# Patient Record
Sex: FEMALE | Employment: OTHER | URBAN - METROPOLITAN AREA
[De-identification: names, ages, dates, MRNs, and addresses within clinical notes are randomized per-mention and may not be internally consistent; named-entity substitution may affect disease eponyms.]

---

## 2017-12-20 ENCOUNTER — HOSPITAL ENCOUNTER (OUTPATIENT)
Dept: PHYSICAL THERAPY | Age: 62
Discharge: HOME | End: 2017-12-20
Payer: OTHER GOVERNMENT

## 2017-12-20 PROCEDURE — 97161 PT EVAL LOW COMPLEX 20 MIN: CPT

## 2017-12-20 PROCEDURE — 97110 THERAPEUTIC EXERCISES: CPT

## 2017-12-20 PROCEDURE — 97140 MANUAL THERAPY 1/> REGIONS: CPT

## 2017-12-20 NOTE — PROGRESS NOTES
PT DAILY TREATMENT NOTE/HIP EVAL3-16    Patient Name: Penny Pay  Date:2017  : 1955  [x]  Patient  Verified  Payor:  / Plan: [facility]  RETIREES AND DEPENDENTS / Product Type: Harvinder Kevin /    In LRFE:[id number]  Out time:1020  Total Treatment Time (min): 62   Visit #: 1 of 10    Treatment Area: Pain in left hip [M25.552]    SUBJECTIVE  Pain Level (0-10 scale): 1-2  []constant [x]intermittent []improving []worsening []no change since onset    Any medication changes, allergies to medications, adverse drug reactions, diagnosis change, or new procedure performed?: [x] No    [] Yes (see summary sheet for update)  Subjective functional status/changes:     Subjective:   Pt c/o (L) hip for the past 6 weeks or more. X-Rays were taken but not sure of the result. Started new exercise program with walking then strength training. Once starting weights, (L) hip pain started. Pain with crossing leg to tie shoe         Chief Complaint/: (L) Hip pain that prevents working out, Decr walking tolerance, difficult walking up stairs    Onset: 6 Weeks ago    Mechanism of Injury: possibly lifting weights    PMHx/Surgical Hx: DM    PLOF: No (L) Hip pain and able to walk w/o (L) Hip pain    What type of work do you do? None    Living Situation: Lives at home with  in two story home    What type of daily activities/hobbies?  Clean house, Gym workout    Current Health Status:  Good    Barriers: [x]pain []financial []time []transportation []other    Goal: Want the pain to stop with gym weight training    FOTO: 44     Motivation: Good    Substance use: []Alcohol []Tobacco []other:     FABQ Score: []low []elevate    Cognition: A & O x 3    Other:        OBJECTIVE/EXAMINATION  - Gait: Dec (B) Hip Flx, Secr (B) Pelvi sway, Decr Trunk rot  - Elev (R) Crest  - Decr mobility (B) Innominate in stork stance  - (R) Innominate Ant rot  - Elev (R) Sacral base  - (+) Piriformis Test  - MMT Knee Flx (R) 4 (L) 5, ext (B) 5/5, Hip Flx (B) 3+ Hip ABD (R) 5- (L) 3  - HS 90/90 (R) 161 (L) 136  - Limited ability to lay prone w/o pillow under pelvis            24 min [x]Eval                  []Re-Eval       28 min Therapeutic Exercise:  [] See flow sheet :   Rationale: increase ROM, increase strength and improve coordination to improve the patients ability to tolerate increased activity levels    10 min Manual Therapy:  (B) Innominate mobs, (B) LE LAD, (B) Lx Rot mobs   Rationale: decrease pain, increase ROM and increase tissue extensibility to tolerate increased activity levels          With   [x] TE   [] TA   [] neuro   [] other: Patient Education: [x] Review HEP    [] Progressed/Changed HEP based on:   [] positioning   [] body mechanics   [] transfers   [] heat/ice application    [] other:      Other Objective/Functional Measures:   - Improved pelvic mobility and gait pattern after treatment    Pain Level (0-10 scale) post treatment: 0    ASSESSMENT/Changes in Function:      Patient will continue to benefit from skilled PT services to modify and progress therapeutic interventions, address functional mobility deficits, address ROM deficits, address strength deficits, analyze and address soft tissue restrictions and analyze and cue movement patterns to attain remaining goals.      [x]  See Plan of Care  []  See progress note/recertification  []  See Discharge Summary         Progress towards goals / Updated goals:        PLAN  [x]  Upgrade activities as tolerated     [x]  Continue plan of care  []  Update interventions per flow sheet       []  Discharge due to:_  []  Other:_      Jaswinder Mccray, PT 12/20/2017  9:16 AM

## 2017-12-20 NOTE — PROGRESS NOTES
[facility] [address], [address] [place] [address], [address],[place] [address]  [phone] fax      Plan of Care/ Statement of Necessity for Physical Therapy Services    Patient name: Chelsea Ta Start of Care: 2017   Referral source: Hernan Clinton DO : 1955    Medical Diagnosis: Pain in left hip [M25.552]   Onset Date:6 Weeks ago    Treatment Diagnosis: Pelvic Obliquity, LE Weakness   Prior Hospitalization: see medical history Provider#: [id number]   Medications: Verified on Patient summary List    Comorbidities: DM   Prior Level of Function: No (L) Hip pain and able to walk w/o (L) Hip pain     The Plan of Care and following information is based on the information from the initial evaluation. Assessment/ key information: Patient is a 58 y.o. female referred to PT with the above Dx. Patient seen today for c/o (L) hip pain for the past 6 weeks or more possibly due to new exercise program that included walking and weight training. The (L) hip pain started once she started lifting weights. Pain with crossing leg to tie (L) shoe. Patient presents to PT with an impaired gait, decreased balance, decreased strength, decreased flexibility, and decreased mobility. Patient s/s appear to be consistent w/ diagnosis. Patient demonstrates the potential to make functional gains within a reasonable time frame. Patient will benefit from skilled PT to address impairments and improve functional mobility, strength, gait and balance for an improved quality of life.   Fall Risk Assessment: Patient demonstrates a low Fall Risk   Evaluation Complexity History MEDIUM  Complexity : 1-2 comorbidities / personal factors will impact the outcome/ POC ; Examination MEDIUM Complexity : 3 Standardized tests and measures addressing body structure, function, activity limitation and / or participation in recreation  ;Presentation LOW Complexity : Stable, uncomplicated  ;Clinical Decision Making MEDIUM Complexity : FOTO score of 26-74  Overall Complexity Rating: LOW   Problem List: pain affecting function, decrease ROM, decrease strength, impaired gait/ balance, decrease ADL/ functional abilitiies, decrease activity tolerance, decrease flexibility/ joint mobility, decrease transfer abilities and other FOTO = 44   Treatment Plan may include any combination of the following: Therapeutic exercise, Therapeutic activities, Neuromuscular re-education, Physical agent/modality, Gait/balance training, Manual therapy, Patient education, Self Care training and Functional mobility training  Patient / Family readiness to learn indicated by: asking questions, trying to perform skills and interest  Persons(s) to be included in education: patient (P)  Barriers to Learning/Limitations: None  Patient Goal (s): Want the pain to stop with gym weight training  Patient Self Reported Health Status: good  Rehabilitation Potential: good    Short Term Goals: To be accomplished in 5 treatments:  1. Pt will be compliant and independent with HEP in order to facilitate PT sessions and aid with self management   Eval Status:  Initiated   Current Status:  2. Pt to tolerate 30 min or more of TE and/or Interventions w/o increased s/s   Eval Status:  Initiated   Current Status:    Long Term Goals: To be accomplished in 10 treatments:  1. Pt will report 50% improvement or better with involvement to show a significant increase in function   Eval Status:  Initiated   Current Status:  2. Pt will have decreased pain and increased strength (B) Hip Flx to tolerate walking up and down stairs w/o difficulty    Eval Status:  Initiated   Current Status:  3. Pt will demonstrate TM ambulation for 1/2 mile or more to progress to walking 2 miles for usual exercise 2-5x/week   Eval Status:  Initiated   Current Status:  4.   Pt will tolerate resisted LE strength training to return to usual gym workout w/o difficyulty     Eval Status: Initiated   Current Status:  5. Pt will have improved (L) Hip mobility to allow her to cross (L) leg over (R) to tie her shoe   Eval Status:  Initiated   Current Status:  6. Pt will improve FOTO score to 64 in 11 visits to show significant improvement in function for progress to active community ambulation   Eval Status: 44   Current Status:    Frequency / Duration: Patient to be seen 2-3 times per week for 10 treatments. Patient/ Caregiver education and instruction: Diagnosis, prognosis, self care, activity modification and exercises   [x]  Plan of care has been reviewed with BETSY Titus, PT 12/20/2017 6:57 PM  ________________________________________________________________________    I certify that the above Therapy Services are being furnished while the patient is under my care. I agree with the treatment plan and certify that this therapy is necessary.     [id number] Signature:____________________  Date:____________Time: _________    Please sign and return to [facility] [address] [place] [address], [address] [place] [address],[facility] [address]  [phone] fax

## 2017-12-21 ENCOUNTER — HOSPITAL ENCOUNTER (OUTPATIENT)
Dept: PHYSICAL THERAPY | Age: 62
Discharge: HOME | End: 2017-12-21
Payer: OTHER GOVERNMENT

## 2017-12-21 PROCEDURE — 97140 MANUAL THERAPY 1/> REGIONS: CPT

## 2017-12-21 PROCEDURE — 97110 THERAPEUTIC EXERCISES: CPT

## 2017-12-21 NOTE — PROGRESS NOTES
PT DAILY TREATMENT NOTE 3-16    Patient Name: Martin Garrison  Date:2017  : 1955  [x]  Patient  Verified  Payor:  / Plan: [facility]  RETIREES AND DEPENDENTS / Product Type: [place] /    In time:1130 Out time:1216  Total Treatment Time (min): 46  Visit #: 2 of 10    Treatment Area: Pain in left hip [M25.552]    SUBJECTIVE  Pain Level (0-10 scale): 0  Any medication changes, allergies to medications, adverse drug reactions, diagnosis change, or new procedure performed?: [x] No    [] Yes (see summary sheet for update)  Subjective functional status/changes:   [] No changes reported  Doing pretty good. OBJECTIVE  38 min Therapeutic Exercise:  [x] See flow sheet :   Rationale: increase ROM, increase strength and improve coordination to improve the patients ability to tolerate increased activity levels  8 min Manual Therapy:  (B) Lx Rot mobs, (B) Innominate P/A mobs   Rationale: decrease pain and increase ROM to tolerate increased activity levels         With   [] TE   [] TA   [] neuro   [] other: Patient Education: [x] Review HEP    [] Progressed/Changed HEP based on:   [] positioning   [] body mechanics   [] transfers   [] heat/ice application    [] other:      Other Objective/Functional Measures:   - Good tolerance with first full treatment w/o added pain  - Good response to Lx and pelvic Joint mobs with improved pelvic alignment and gait pattern     Pain Level (0-10 scale) post treatment: 0    ASSESSMENT/Changes in Function:      Patient will continue to benefit from skilled PT services to modify and progress therapeutic interventions, address functional mobility deficits, address ROM deficits, address strength deficits, analyze and address soft tissue restrictions and analyze and cue movement patterns to attain remaining goals. []  See Plan of Care  []  See progress note/recertification  []  See Discharge Summary         Short Term Goals: To be accomplished in 5 treatments:  1.   Pt will be compliant and independent with HEP in order to facilitate PT sessions and aid with self management                        Eval Status:  Initiated                        Current Status: Pt reports compliance with HEP 12/21/17  2. Pt to tolerate 30 min or more of TE and/or Interventions w/o increased s/s                        Eval Status:  Initiated                        Current Status:     Long Term Goals: To be accomplished in 10 treatments:  1. Pt will report 50% improvement or better with involvement to show a significant increase in function                        Eval Status:  Initiated                        Current Status:  2. Pt will have decreased pain and increased strength (B) Hip Flx to tolerate walking up and down stairs w/o difficulty                         Eval Status:  Initiated                        Current Status:  3. Pt will demonstrate TM ambulation for 1/2 mile or more to progress to walking 2 miles for usual exercise 2-5x/week                        Eval Status:  Initiated                        Current Status:  4. Pt will tolerate resisted LE strength training to return to usual gym workout w/o difficyulty                          Eval Status:  Initiated                        Current Status:  5. Pt will have improved (L) Hip mobility to allow her to cross (L) leg over (R) to tie her shoe                        Eval Status:  Initiated                        Current Status: Improved tolerance today 12/21/17  6.   Pt will improve FOTO score to 64 in 11 visits to show significant improvement in function for progress to active community ambulation                        Eval Status: 44                        Current Status:  PLAN  [x]  Upgrade activities as tolerated     [x]  Continue plan of care  []  Update interventions per flow sheet       []  Discharge due to:_  []  Other:_      Gina Cross PT 12/21/2017  12:22 PM    Future Appointments  Date Time Provider Department Center   12/29/2017 9:30 AM Caity Mckenzie, PT [facility]   1/2/2018 3:00 PM Sandra Dickens, PT [facility]   1/5/2018 11:00 AM Caity Mckenzie, PT [facility]   1/9/2018 1:30 PM Sandra Dickens, PT [facility]   1/12/2018 10:30 AM Caity Mckenzie, PT [facility]   1/16/2018 3:00 PM Caity Mckenzie, PT [facility]   1/18/2018 11:30 AM Sandra Dickens, PT [facility]   1/23/2018 1:30 PM Caity Mckenzie, PT [facility]   1/25/2018 10:30 AM Caity Mckenzie, PT [facility]   1/30/2018 1:30 PM Caity Mckenzie, PT [facility]

## 2017-12-29 ENCOUNTER — HOSPITAL ENCOUNTER (OUTPATIENT)
Dept: PHYSICAL THERAPY | Age: 62
Discharge: HOME | End: 2017-12-29
Payer: OTHER GOVERNMENT

## 2017-12-29 PROCEDURE — 97110 THERAPEUTIC EXERCISES: CPT

## 2018-01-02 ENCOUNTER — HOSPITAL ENCOUNTER (OUTPATIENT)
Dept: PHYSICAL THERAPY | Age: 63
Discharge: HOME | End: 2018-01-02
Payer: OTHER GOVERNMENT

## 2018-01-02 PROCEDURE — 97530 THERAPEUTIC ACTIVITIES: CPT

## 2018-01-02 PROCEDURE — 97110 THERAPEUTIC EXERCISES: CPT

## 2018-01-02 NOTE — PROGRESS NOTES
PT DAILY TREATMENT NOTE 3-16    Patient Name: Gloria Rule  Date:2018  : 1955  [x]  Patient  Verified  Payor:  / Plan: [facility]  RETIREES AND DEPENDENTS / Product Type: Mariefreidaisra Sax /    In time:3:00 Out time: 3:48  Total Treatment Time (min): 48  Visit #: 3 of 10    Treatment Area: Pain in left hip [M25.552]    SUBJECTIVE  Pain Level (0-10 scale): 0  Any medication changes, allergies to medications, adverse drug reactions, diagnosis change, or new procedure performed?: [x] No    [] Yes (see summary sheet for update)  Subjective functional status/changes:   [] No changes reported  No pain. Just can feel the difference in the strength of the (L) Hip vs (R)    OBJECTIVE  39 min Therapeutic Exercise:  [x] See flow sheet :   Rationale: increase ROM, increase strength and improve coordination to improve the patients ability to tolerate increased activity levels  9 min Therapeutic Activity:  [x]  See flow sheet : TM ambulation   Rationale: increase ROM, increase strength, improve coordination and Improve walking tolerance  to improve the patients ability to perform increased ADL      With   [x] TE   [] TA   [] neuro   [] other: Patient Education: [x] Review HEP    [] Progressed/Changed HEP based on:   [] positioning   [] body mechanics   [] transfers   [] heat/ice application    [] other:      Other Objective/Functional Measures:   - Good exercise participation  - Pt amb on TM for 1/2 mile w/o discomfort    Pain Level (0-10 scale) post treatment: 0    ASSESSMENT/Changes in Function:      Patient will continue to benefit from skilled PT services to modify and progress therapeutic interventions, address functional mobility deficits, address ROM deficits, address strength deficits, analyze and address soft tissue restrictions and analyze and cue movement patterns to attain remaining goals.      []  See Plan of Care  []  See progress note/recertification  []  See Discharge Summary         Short Term Goals: To be accomplished in 5 treatments:  1. Pt will be compliant and independent with HEP in order to facilitate PT sessions and aid with self management                        Eval Status:  Initiated                        Current Status: Pt demo compliance with HEP 1/2/18  2. Pt to tolerate 30 min or more of TE and/or Interventions w/o increased s/s                        Eval Status:  Initiated                        Current Status: Met at 50 Min 1/2/18     Long Term Goals: To be accomplished in 10 treatments:  1. Pt will report 50% improvement or better with involvement to show a significant increase in function                        Eval Status:  Initiated                        Current Status:  2. Pt will have decreased pain and increased strength (B) Hip Flx to tolerate walking up and down stairs w/o difficulty                         Eval Status:  Initiated                        Current Status:  3. Pt will demonstrate TM ambulation for 1/2 mile or more to progress to walking 2 miles for usual exercise 2-5x/week                        Eval Status:  Initiated                        Current Status: Met 1/2/18  4. Pt will tolerate resisted LE strength training to return to usual gym workout w/o difficyulty                          Eval Status:  Initiated                        Current Status: Good progress 1/2/18  5. Pt will have improved (L) Hip mobility to allow her to cross (L) leg over (R) to tie her shoe                        Eval Status:  Initiated                        Current Status: Improved tolerance today 12/21/17  6.   Pt will improve FOTO score to 64 in 11 visits to show significant improvement in function for progress to active community ambulation                        Eval Status: 44                        Current Status:  PLAN  [x]  Upgrade activities as tolerated     [x]  Continue plan of care  []  Update interventions per flow sheet       []  Discharge due to:_  []  Other:_ Ciera Montano, PT 1/2/2018  3:48 PM    Future Appointments  Date Time Provider Ruth Carlotta   1/5/2018 11:00 AM April Mireille, PT [facility]   1/9/2018 1:30 PM Ciera Montano, PT [facility]   1/12/2018 10:30 AM April Mireille, PT [facility]   1/16/2018 3:00 PM April Mireille, PT [facility]   1/18/2018 11:30 AM Ciera Montano, PT [facility]   1/23/2018 1:30 PM April Mireille, PT [facility]   1/25/2018 10:30 AM April Mireille, PT [facility]   1/30/2018 1:30 PM April Mireille, PT [facility]

## 2018-01-05 ENCOUNTER — APPOINTMENT (OUTPATIENT)
Dept: PHYSICAL THERAPY | Age: 63
End: 2018-01-05
Payer: OTHER GOVERNMENT

## 2018-01-09 ENCOUNTER — APPOINTMENT (OUTPATIENT)
Dept: PHYSICAL THERAPY | Age: 63
End: 2018-01-09
Payer: OTHER GOVERNMENT

## 2018-01-12 ENCOUNTER — APPOINTMENT (OUTPATIENT)
Dept: PHYSICAL THERAPY | Age: 63
End: 2018-01-12
Payer: OTHER GOVERNMENT

## 2018-01-16 ENCOUNTER — APPOINTMENT (OUTPATIENT)
Dept: PHYSICAL THERAPY | Age: 63
End: 2018-01-16
Payer: OTHER GOVERNMENT

## 2018-01-18 ENCOUNTER — APPOINTMENT (OUTPATIENT)
Dept: PHYSICAL THERAPY | Age: 63
End: 2018-01-18
Payer: OTHER GOVERNMENT

## 2018-01-23 ENCOUNTER — APPOINTMENT (OUTPATIENT)
Dept: PHYSICAL THERAPY | Age: 63
End: 2018-01-23
Payer: OTHER GOVERNMENT

## 2018-01-25 ENCOUNTER — APPOINTMENT (OUTPATIENT)
Dept: PHYSICAL THERAPY | Age: 63
End: 2018-01-25
Payer: OTHER GOVERNMENT

## 2018-01-30 ENCOUNTER — APPOINTMENT (OUTPATIENT)
Dept: PHYSICAL THERAPY | Age: 63
End: 2018-01-30
Payer: OTHER GOVERNMENT

## 2018-08-05 NOTE — PROGRESS NOTES
[facility] [address], [address] [place] [address], [address],[place] [address]  [phone] fax    Discharge Summary    Patient name: Ritika Kinsey Start of Care: 2017   Referral source: Michaela Bautista DO : 1955                          Medical Diagnosis: Pain in left hip [M25.552] Onset Date:6 Weeks ago                          Treatment Diagnosis: Pelvic Obliquity, LE Weakness   Prior Hospitalization: see medical history Provider#: [id number]   Medications: Verified on Patient summary List    Comorbidities: DM   Prior Level of Function: No (L) Hip pain and able to walk w/o (L) Hip pain      Visits from Start of Care: 4    Missed Visits: 0  Reporting Period : 17 to 18      Summary of Care:  Pt seen for 4 visits and request to be discontinued due to geelin better.     Short Term Goals: To be accomplished in 5 treatments:  1.  Pt will be compliant and independent with HEP in order to facilitate PT sessions and aid with self management                        Eval Status:  Initiated                        Current Status: Pt demo compliance with HEP 18  2.  Pt to tolerate 30 min or more of TE and/or Interventions w/o increased s/s                        Eval Status:  Initiated                        UOZVWFE Status: Met at 50 Min 18      Long Term Goals: To be accomplished in 10 treatments:  1.  Pt will report 50% improvement or better with involvement to show a significant increase in function                        Eval Status:  Initiated                        Current Status:  2.  Pt will have decreased pain and increased strength (B) Hip Flx to tolerate walking up and down stairs w/o difficulty                         Eval Status:  Initiated                        Current Status: Pain 0/10, stairs not assessed 18  3.  Pt will demonstrate TM ambulation for 1/2 mile or more to progress to walking 2 miles for usual exercise 2-5x/week                        Eval Status:  Initiated                        [id number] Status: Met 1/2/18  4.  Pt will tolerate resisted LE strength training to return to usual gym workout w/o difficyulty                          Eval Status:  Initiated                        XEKBEFG Status: Good progress 1/2/18  5.  Pt will have improved (L) Hip mobility to allow her to cross (L) leg over (R) to tie her shoe                        Eval Status:  Initiated                        Current Status: Improved tolerance today 12/21/17  6.  Pt will improve FOTO score to 64 in 11 visits to show significant improvement in function for progress to active community ambulation                        Eval Status: 44                        Current Status:  Not assessed       ASSESSMENT/RECOMMENDATIONS:  []Discontinue therapy progressing towards or have reached established goals  []Discontinue therapy due to lack of appreciable progress towards goals  []Discontinue therapy due to lack of attendance or compliance  [x]Other: Per Patient request.  Reports doing good    Thank you for this referral.     Duane Botello, PT 8/5/2018 11:01 AM

## 2022-11-21 NOTE — PROGRESS NOTES
His hemoglobin was a little bit low after his hip surgery.  We will go ahead and repeat that.  Even though its only been about 3 weeks out expected to be improved but not normal at this time.   PT DAILY TREATMENT NOTE     Patient Name: Godwin Odom  Date:2017  : 1955  [x]  Patient  Verified  Payor:  / Plan: [facility]  RETIREES AND DEPENDENTS / Product Type:  /    In time:924  Out time:1002  Total Treatment Time (min):31  Visit #: 3 of 10    Treatment Area: Pain in left hip [M25.552]    SUBJECTIVE  Pain Level (0-10 scale): 6  Any medication changes, allergies to medications, adverse drug reactions, diagnosis change, or new procedure performed?: [x] No    [] Yes (see summary sheet for update)  Subjective functional status/changes:   [] No changes reported  I have pain every day - I have neck and back pain that is always there and is not going to go away. The hip is not so bad until I go to bed, and I can't lie on that L side for very long because it will start to hurt. Maybe about 10 minutes. , sit or stand too long or walk.      OBJECTIVE    Modality rationale: PD   Min Type Additional Details    [] Estim:  []Unatt       []IFC  []Premod                        []Other:  []w/ice   []w/heat  Position:  Location:    [] Estim: []Att    []TENS instruct  []NMES                    []Other:  []w/US   []w/ice   []w/heat  Position:  Location:    []  Traction: [] Cervical       []Lumbar                       [] Prone          []Supine                       []Intermittent   []Continuous Lbs:  [] before manual  [] after manual    []  Ultrasound: []Continuous   [] Pulsed                           []1MHz   []3MHz W/cm2:  Location:    []  Iontophoresis with dexamethasone         Location: [] Take home patch   [] In clinic    []  Ice     []  heat  []  Ice massage  []  Laser   []  Anodyne Position:  Location:    []  Laser with stim  []  Other:  Position:  Location:    []  Vasopneumatic Device Pressure:       [] lo [] med [] hi   Temperature: [] lo [] med [] hi   [] Skin assessment post-treatment:  []intact []redness- no adverse reaction    []redness  adverse reaction:      min []Eval                  []Re-Eval       31 min Therapeutic Exercise:  [x] See flow sheet :   Rationale: increase ROM, increase strength, improve coordination, improve balance and increase proprioception to improve the patients ability to aid with increase tolerance to ADLS and activities     min Therapeutic Activity:  []  See flow sheet :   Rationale:   to improve the patients ability to       min Neuromuscular Re-education:  []  See flow sheet :   Rationale:   to improve the patients ability to      min Manual Therapy:     Rationale:  to      min Gait Training:  ___ feet with ___ device on level surfaces with ___ level of assist   Rationale: With   [] TE   [] TA   [] neuro   [] other: Patient Education: [x] Review HEP    [] Progressed/Changed HEP based on:   [] positioning   [] body mechanics   [] transfers   [] heat/ice application    [] other:      Other Objective/Functional Measures: VC exercise and tech. Pain Level (0-10 scale) post treatment: right now I am fine-    ASSESSMENT/Changes in Function: Tolerated well. She notes she always has neck and back pain. The Hip pain is at night time when she is trying to sleep. I discussed trial of modalities for the L hip however she declined and will let us know next session how it goes tonight with the hip and whether she wants to try any modalities next session. Patient will continue to benefit from skilled PT services to modify and progress therapeutic interventions, address functional mobility deficits, address ROM deficits, address strength deficits, analyze and address soft tissue restrictions, analyze and cue movement patterns, analyze and modify body mechanics/ergonomics, assess and modify postural abnormalities and instruct in home and community integration to attain remaining goals.      [x]  See Plan of Care  []  See progress note/recertification  []  See Discharge Summary         Progress towards goals / Updated goals:  Short Term Goals: To be accomplished in 5 treatments:  1.  Pt will be compliant and independent with HEP in order to facilitate PT sessions and aid with self management                        Eval Status:  Initiated                        Current Status: Pt reports compliance with HEP 12/21/17 12/29/17  2.  Pt to tolerate 30 min or more of TE and/or Interventions w/o increased s/s                        Eval Status:  Initiated                        NZWKIRR Status: 31 12/29/17      Long Term Goals: To be accomplished in 10 treatments:  1.  Pt will report 50% improvement or better with involvement to show a significant increase in function                        Eval Status:  Initiated                        Current Status:  2.  Pt will have decreased pain and increased strength (B) Hip Flx to tolerate walking up and down stairs w/o difficulty                         Eval Status:  Initiated                        Current Status:  3.  Pt will demonstrate TM ambulation for 1/2 mile or more to progress to walking 2 miles for usual exercise 2-5x/week                        Eval Status:  Initiated                        Current Status:  4.  Pt will tolerate resisted LE strength training to return to usual gym workout w/o difficyulty                          Eval Status:  Initiated                        Current Status:  5.  Pt will have improved (L) Hip mobility to allow her to cross (L) leg over (R) to tie her shoe                        Eval Status:  Initiated                        Current Status: Improved tolerance today 12/21/17  6.  Pt will improve FOTO score to 64 in 11 visits to show significant improvement in function for progress to active community ambulation                        Eval Status: 44                        Current Status:    PLAN  [x]  Upgrade activities as tolerated     [x]  Continue plan of care  []  Update interventions per flow sheet       []  Discharge due to:_  []  Other:_      Francisca Pabon PT 12/29/2017  9:27 AM    Future Appointments  Date Time Provider Ruth Laguerre   12/29/2017 9:30 AM Alvin Thompson, PT [facility]   1/2/2018 3:00 PM Harriet Andrade, PT [facility]   1/5/2018 11:00 AM Alvin Thompson, PT [facility]   1/9/2018 1:30 PM Harriet Andrade, PT [facility]   1/12/2018 10:30 AM Alvin Thompson, PT [facility]   1/16/2018 3:00 PM Alvin Thompson, PT [facility]   1/18/2018 11:30 AM Harriet Andrade, PT [facility]   1/23/2018 1:30 PM Alvin Thompson, PT [facility]   1/25/2018 10:30 AM Alvin Thompson, PT [facility]   1/30/2018 1:30 PM Alvin Thompson, PT [facility]

## 2024-12-04 ENCOUNTER — TRANSCRIBE ORDERS (OUTPATIENT)
Facility: HOSPITAL | Age: 69
End: 2024-12-04

## 2024-12-04 DIAGNOSIS — Z13.6 SCREENING FOR AAA (AORTIC ABDOMINAL ANEURYSM): Primary | ICD-10-CM

## 2024-12-04 DIAGNOSIS — Z13.6 SCREENING FOR AAA (ABDOMINAL AORTIC ANEURYSM): Primary | ICD-10-CM

## 2024-12-11 ENCOUNTER — HOSPITAL ENCOUNTER (OUTPATIENT)
Facility: HOSPITAL | Age: 69
End: 2024-12-11
Payer: MEDICARE

## 2024-12-11 ENCOUNTER — HOSPITAL ENCOUNTER (OUTPATIENT)
Facility: HOSPITAL | Age: 69
Discharge: HOME | End: 2024-12-14
Payer: MEDICARE

## 2024-12-11 DIAGNOSIS — Z13.6 SCREENING FOR AAA (AORTIC ABDOMINAL ANEURYSM): ICD-10-CM

## 2024-12-11 PROCEDURE — 76706 US ABDL AORTA SCREEN AAA: CPT
